# Patient Record
Sex: FEMALE | Race: WHITE | URBAN - METROPOLITAN AREA
[De-identification: names, ages, dates, MRNs, and addresses within clinical notes are randomized per-mention and may not be internally consistent; named-entity substitution may affect disease eponyms.]

---

## 2022-03-19 PROBLEM — E66.01 MORBID OBESITY (HCC): Status: ACTIVE | Noted: 2021-12-13

## 2022-06-24 ENCOUNTER — OFFICE VISIT (OUTPATIENT)
Dept: ENDOCRINOLOGY | Age: 22
End: 2022-06-24
Payer: COMMERCIAL

## 2022-06-24 VITALS
OXYGEN SATURATION: 98 % | SYSTOLIC BLOOD PRESSURE: 124 MMHG | WEIGHT: 285 LBS | DIASTOLIC BLOOD PRESSURE: 98 MMHG | HEART RATE: 81 BPM

## 2022-06-24 DIAGNOSIS — E88.81 INSULIN RESISTANCE: ICD-10-CM

## 2022-06-24 DIAGNOSIS — E66.01 MORBID OBESITY (HCC): ICD-10-CM

## 2022-06-24 DIAGNOSIS — L68.0 HIRSUTISM: Primary | ICD-10-CM

## 2022-06-24 PROCEDURE — 99213 OFFICE O/P EST LOW 20 MIN: CPT | Performed by: INTERNAL MEDICINE

## 2022-06-24 RX ORDER — SPIRONOLACTONE 50 MG/1
50 TABLET, FILM COATED ORAL DAILY
Qty: 30 TABLET | Refills: 11 | Status: SHIPPED | OUTPATIENT
Start: 2022-06-24

## 2022-06-24 RX ORDER — FLUOXETINE HYDROCHLORIDE 20 MG/1
CAPSULE ORAL DAILY
COMMUNITY
Start: 2022-02-17

## 2022-06-24 RX ORDER — METFORMIN HYDROCHLORIDE 500 MG/1
1000 TABLET, EXTENDED RELEASE ORAL DAILY
Qty: 60 TABLET | Refills: 11 | Status: SHIPPED | OUTPATIENT
Start: 2022-06-24

## 2022-06-24 RX ORDER — DEXAMETHASONE 1 MG
TABLET ORAL
Qty: 1 TABLET | Refills: 1 | Status: SHIPPED | OUTPATIENT
Start: 2022-06-24

## 2022-06-24 ASSESSMENT — ENCOUNTER SYMPTOMS
DIARRHEA: 0
NAUSEA: 0
CONSTIPATION: 0

## 2022-06-24 NOTE — PROGRESS NOTES
Cassy Salinas MD, [address]            Reason for visit: Follow-up of hirsutism      ASSESSMENT AND PLAN:    1. Hirsutism  She has mild hirsutism but not PCOS (menses have always been regular). She feels that she has responded well to spironolactone. I will continue it as prescribed. I will also arrange a low-dose overnight dexamethasone suppression test to exclude Cushing syndrome. Follow up in 1 year. - spironolactone (ALDACTONE) 50 MG tablet; Take 1 tablet by mouth daily  Dispense: 30 tablet; Refill: 11  - dexamethasone (DECADRON) 1 MG tablet; Take tablet at 11:00 PM the evening before 8:00 AM labwork  Dispense: 1 tablet; Refill: 1  - Cortisol, Baseline; Future    2. Insulin resistance  - metFORMIN (GLUCOPHAGE-XR) 500 MG extended release tablet; Take 2 tablets by mouth daily  Dispense: 60 tablet; Refill: 11    3. Morbid obesity ([address]      Follow-up and Dispositions    · Return in about 1 year (around 6/24/2023). History of Present Illness:    Emperatriz Grewal  returns for follow-up of hirsutism. She was diagnosed with PCOS (by Lynn Bradley NP) in 2020.     Disease history: Menarche age 6-9. Menses were always regular (even before OCPs). Abdominal and facial hirsutism started in teens and has been nonprogressive (improved with medical therapy); both are treated with shaving. She has always been overweight.     Current symptoms: See review of systems below     Pregnancy: Nulligravid, no know infertility, no plans for children in the near future     Treatment: Metformin XR 1000 mg at bedtime (started 2020), spironolactone 25 mg daily BID (started 2020, changed to 50 mg daily 12/2021), oral contraceptive therapy (started 2019), Adipex (started 1/2021, stopped early 2022)     Pertinent labs:  10/28/2020: TSH 1.527, glucose 103.  1/21/2021 at 3:26 PM: Testosterone 58, insulin 126 (concomitant glucose not available).   12/13/2021 at 10:35 AM: Testosterone 24, free testosterone 3.6, DHEA-sulfate 321, androstenedione 74, 17 hydroxyprogesterone 15, anti-müllerian hormone 4.30.     Pertinent imaging: none    Review of Systems   Constitutional: Positive for fatigue and unexpected weight change (gained 12 pounds in 6 months). Gastrointestinal: Negative for constipation, diarrhea and nausea. Endocrine:        Facial hirsutism is improved. Psychiatric/Behavioral: Positive for sleep disturbance (frequent awakening). BP (!) 124/98 (Site: Left Upper Arm, Position: Sitting)   Pulse 81   Wt 285 lb (129.3 kg)   SpO2 98%   Wt Readings from Last 3 Encounters:   06/24/22 285 lb (129.3 kg)   12/13/21 273 lb (123.8 kg)       Physical Exam  HENT:      Head: Normocephalic. Neck:      Thyroid: No thyroid mass or thyromegaly. Cardiovascular:      Rate and Rhythm: Normal rate. Pulmonary:      Effort: No respiratory distress. Breath sounds: Normal breath sounds. Neurological:      Mental Status: She is alert. Psychiatric:         Mood and Affect: Mood normal.         Behavior: Behavior normal.         Orders Placed This Encounter   Procedures    Cortisol, Baseline     Standing Status:   Future     Standing Expiration Date:   6/24/2023       Current Outpatient Medications   Medication Sig Dispense Refill    FLUoxetine (PROZAC) 20 MG capsule daily      spironolactone (ALDACTONE) 50 MG tablet Take 1 tablet by mouth daily 30 tablet 11    metFORMIN (GLUCOPHAGE-XR) 500 MG extended release tablet Take 2 tablets by mouth daily 60 tablet 11    dexamethasone (DECADRON) 1 MG tablet Take tablet at 11:00 PM the evening before 8:00 AM labwork 1 tablet 1    levonorgestrel-ethinyl estradiol (AVIANE) 0.1-20 MG-MCG per tablet Take 1 tablet by mouth daily       No current facility-administered medications for this visit. Cornelious Gowers, MD, FACE      Portions of this note were generated with the assistance of voice recognition software.   As such, some errors in transcription may be present.

## 2023-06-08 ENCOUNTER — OFFICE VISIT (OUTPATIENT)
Dept: ENDOCRINOLOGY | Age: 23
End: 2023-06-08
Payer: COMMERCIAL

## 2023-06-08 VITALS
DIASTOLIC BLOOD PRESSURE: 84 MMHG | HEART RATE: 90 BPM | SYSTOLIC BLOOD PRESSURE: 128 MMHG | WEIGHT: 293 LBS | OXYGEN SATURATION: 98 %

## 2023-06-08 DIAGNOSIS — L68.0 HIRSUTISM: Primary | ICD-10-CM

## 2023-06-08 DIAGNOSIS — E88.81 INSULIN RESISTANCE: ICD-10-CM

## 2023-06-08 DIAGNOSIS — E66.01 MORBID OBESITY (HCC): ICD-10-CM

## 2023-06-08 DIAGNOSIS — L68.0 HIRSUTISM: ICD-10-CM

## 2023-06-08 LAB — TSH W FREE THYROID IF ABNORMAL: 0.9 UIU/ML (ref 0.36–3.74)

## 2023-06-08 PROCEDURE — 99214 OFFICE O/P EST MOD 30 MIN: CPT | Performed by: INTERNAL MEDICINE

## 2023-06-08 RX ORDER — SPIRONOLACTONE 50 MG/1
50 TABLET, FILM COATED ORAL DAILY
Qty: 30 TABLET | Refills: 11 | Status: SHIPPED | OUTPATIENT
Start: 2023-06-08

## 2023-06-08 RX ORDER — METFORMIN HYDROCHLORIDE 500 MG/1
1000 TABLET, EXTENDED RELEASE ORAL DAILY
Qty: 60 TABLET | Refills: 11 | Status: SHIPPED | OUTPATIENT
Start: 2023-06-08

## 2023-06-08 ASSESSMENT — ENCOUNTER SYMPTOMS
CONSTIPATION: 0
DIARRHEA: 1
NAUSEA: 1

## 2023-06-08 NOTE — PROGRESS NOTES
Carlita Sanchez MD, [address] [facility]            Reason for visit: Follow-up of hirsutism      ASSESSMENT AND PLAN:    1. Hirsutism  She has mild hirsutism but not PCOS (menses have always been regular). She feels that she has responded well to spironolactone. I will continue it as prescribed. Syndrome was previously excluded with a normal low-dose overnight dexamethasone suppression test.  I will check labs today. Follow up in 1 year. - spironolactone (ALDACTONE) 50 MG tablet; Take 1 tablet by mouth daily  Dispense: 30 tablet; Refill: 11    2. Insulin resistance  - metFORMIN (GLUCOPHAGE-XR) 500 MG extended release tablet; Take 2 tablets by mouth daily  Dispense: 60 tablet; Refill: 11    3. Morbid obesity ([address]      Follow-up and Dispositions    Return in about 1 year (around 6/8/2024). History of Present Illness:    Noah Patricio  returns for follow-up of hirsutism. She was diagnosed with PCOS (by Vimal Mccoy NP) in 2020. Disease history: Menarche age 6-9. Menses were always regular (even before OCPs). Abdominal and facial hirsutism started in teens and has been nonprogressive (improved with medical therapy); both are treated with shaving. She has always been overweight. Current symptoms: See review of systems below     Pregnancy: Nulligravid, no know infertility, no plans for children in the near future     Treatment: Metformin XR 1000 mg at bedtime (started 2020), spironolactone 25 mg daily BID (started 2020, changed to 50 mg daily 12/2021), oral contraceptive therapy (started 2019), Adipex (started 1/2021, stopped early 2022)     Pertinent labs:  10/28/2020: TSH 1.527, glucose 103.  1/21/2021 at 3:26 PM: Testosterone 58, insulin 126 (concomitant glucose not available). 12/13/2021 at 10:35 AM: Testosterone 24, free testosterone 3.6, DHEA-sulfate 321, androstenedione 74, 17 hydroxyprogesterone 15, anti-müllerian hormone 4.30.

## 2023-06-09 LAB
ALBUMIN SERPL-MCNC: 3.9 G/DL (ref 3.5–5)
ALBUMIN/GLOB SERPL: 1.1 (ref 0.4–1.6)
ALP SERPL-CCNC: 65 U/L (ref 50–136)
ALT SERPL-CCNC: 40 U/L (ref 12–65)
ANION GAP SERPL CALC-SCNC: 7 MMOL/L (ref 2–11)
AST SERPL-CCNC: 21 U/L (ref 15–37)
BILIRUB SERPL-MCNC: 0.4 MG/DL (ref 0.2–1.1)
BUN SERPL-MCNC: 10 MG/DL (ref 6–23)
CALCIUM SERPL-MCNC: 9.3 MG/DL (ref 8.3–10.4)
CHLORIDE SERPL-SCNC: 105 MMOL/L (ref 101–110)
CO2 SERPL-SCNC: 26 MMOL/L (ref 21–32)
CREAT SERPL-MCNC: 0.9 MG/DL (ref 0.6–1)
EST. AVERAGE GLUCOSE BLD GHB EST-MCNC: 91 MG/DL
GLOBULIN SER CALC-MCNC: 3.6 G/DL (ref 2.8–4.5)
GLUCOSE SERPL-MCNC: 75 MG/DL (ref 65–100)
HBA1C MFR BLD: 4.8 % (ref 4.8–5.6)
POTASSIUM SERPL-SCNC: 4.4 MMOL/L (ref 3.5–5.1)
PROT SERPL-MCNC: 7.5 G/DL (ref 6.3–8.2)
SODIUM SERPL-SCNC: 138 MMOL/L (ref 133–143)

## 2023-07-16 DIAGNOSIS — L68.0 HIRSUTISM: ICD-10-CM

## 2023-07-16 DIAGNOSIS — E88.81 INSULIN RESISTANCE: ICD-10-CM

## 2023-07-17 RX ORDER — SPIRONOLACTONE 50 MG/1
50 TABLET, FILM COATED ORAL DAILY
Qty: 30 TABLET | Refills: 11 | OUTPATIENT
Start: 2023-07-17

## 2023-07-17 RX ORDER — METFORMIN HYDROCHLORIDE 500 MG/1
1000 TABLET, EXTENDED RELEASE ORAL DAILY
Qty: 60 TABLET | Refills: 11 | OUTPATIENT
Start: 2023-07-17

## 2024-06-10 ENCOUNTER — OFFICE VISIT (OUTPATIENT)
Dept: ENDOCRINOLOGY | Age: 24
End: 2024-06-10
Payer: COMMERCIAL

## 2024-06-10 VITALS — DIASTOLIC BLOOD PRESSURE: 70 MMHG | WEIGHT: 293 LBS | SYSTOLIC BLOOD PRESSURE: 118 MMHG

## 2024-06-10 DIAGNOSIS — E88.819 INSULIN RESISTANCE: ICD-10-CM

## 2024-06-10 DIAGNOSIS — L68.0 HIRSUTISM: Primary | ICD-10-CM

## 2024-06-10 DIAGNOSIS — E66.01 MORBID OBESITY (HCC): ICD-10-CM

## 2024-06-10 DIAGNOSIS — L68.0 HIRSUTISM: ICD-10-CM

## 2024-06-10 LAB
ALBUMIN SERPL-MCNC: 3.8 G/DL (ref 3.5–5)
ALBUMIN/GLOB SERPL: 1.1 (ref 1–1.9)
ALP SERPL-CCNC: 70 U/L (ref 35–104)
ALT SERPL-CCNC: 31 U/L (ref 12–65)
ANION GAP SERPL CALC-SCNC: 13 MMOL/L (ref 9–18)
AST SERPL-CCNC: 25 U/L (ref 15–37)
BILIRUB SERPL-MCNC: 0.3 MG/DL (ref 0–1.2)
BUN SERPL-MCNC: 10 MG/DL (ref 6–23)
CALCIUM SERPL-MCNC: 9.3 MG/DL (ref 8.8–10.2)
CHLORIDE SERPL-SCNC: 104 MMOL/L (ref 98–107)
CO2 SERPL-SCNC: 21 MMOL/L (ref 20–28)
CREAT SERPL-MCNC: 0.82 MG/DL (ref 0.6–1.1)
EST. AVERAGE GLUCOSE BLD GHB EST-MCNC: 95 MG/DL
GLOBULIN SER CALC-MCNC: 3.5 G/DL (ref 2.3–3.5)
GLUCOSE SERPL-MCNC: 86 MG/DL (ref 70–99)
HBA1C MFR BLD: 4.9 % (ref 0–5.6)
POTASSIUM SERPL-SCNC: 4.1 MMOL/L (ref 3.5–5.1)
PROT SERPL-MCNC: 7.3 G/DL (ref 6.3–8.2)
SODIUM SERPL-SCNC: 138 MMOL/L (ref 136–145)
TSH W FREE THYROID IF ABNORMAL: 1.47 UIU/ML (ref 0.27–4.2)

## 2024-06-10 PROCEDURE — 99214 OFFICE O/P EST MOD 30 MIN: CPT | Performed by: INTERNAL MEDICINE

## 2024-06-10 RX ORDER — METFORMIN HYDROCHLORIDE 500 MG/1
1000 TABLET, EXTENDED RELEASE ORAL DAILY
Qty: 60 TABLET | Refills: 11 | Status: SHIPPED | OUTPATIENT
Start: 2024-06-10

## 2024-06-10 RX ORDER — SPIRONOLACTONE 50 MG/1
50 TABLET, FILM COATED ORAL DAILY
Qty: 30 TABLET | Refills: 11 | Status: SHIPPED | OUTPATIENT
Start: 2024-06-10

## 2024-06-10 ASSESSMENT — ENCOUNTER SYMPTOMS
DIARRHEA: 1
NAUSEA: 1
CONSTIPATION: 0

## 2024-06-10 NOTE — PROGRESS NOTES
MALACHI Calix MD, [facility]   [facility]            Reason for visit: Follow-up of hirsutism      ASSESSMENT AND PLAN:    1. Hirsutism  She has mild hirsutism but not PCOS (menses have always been regular).  She feels that she has responded well to spironolactone.  I will continue it as prescribed.  Cushing syndrome was previously excluded with a normal low-dose overnight dexamethasone suppression test.  I will check labs today.  Follow up in 1 year.  She is not planning pregnancy (currently on oral contraceptive therapy) and is aware that she will need to discontinue spironolactone before trying.  - spironolactone (ALDACTONE) 50 MG tablet; Take 1 tablet by mouth daily  Dispense: 30 tablet; Refill: 11    2. Insulin resistance  - metFORMIN (GLUCOPHAGE-XR) 500 MG extended release tablet; Take 2 tablets by mouth daily  Dispense: 60 tablet; Refill: 11    3. Morbid obesity ([facility])      Follow-up and Dispositions    Return in about 1 year (around 6/10/2025).             History of Present Illness:    Beata Ro  returns for follow-up of hirsutism. She was diagnosed with PCOS (by Frida Davison NP) in 2020.     Disease history: Menarche age 10-11.  Menses were always regular (even before OCPs).  Abdominal and facial hirsutism started in teens and has been nonprogressive (improved with medical therapy); both are treated with shaving.  She has always been overweight.     Current symptoms: See review of systems below     Pregnancy: Nulligravid, no know infertility, no plans for children in the near future, menses are regular on OCPs     Treatment: Metformin XR 1000 mg at bedtime (started 2020), spironolactone 25 mg daily BID (started 2020, changed to 50 mg daily 12/2021), oral contraceptive therapy (started 2019), Adipex (started 1/2021, stopped early 2022)     Pertinent labs:  10/28/2020: TSH 1.527, glucose 103.  1/21/2021 at 3:26 PM: Testosterone 58, insulin 126

## 2025-03-15 DIAGNOSIS — L68.0 HIRSUTISM: ICD-10-CM

## 2025-03-19 RX ORDER — SPIRONOLACTONE 50 MG/1
50 TABLET, FILM COATED ORAL DAILY
Qty: 90 TABLET | Refills: 4 | OUTPATIENT
Start: 2025-03-19

## 2025-06-10 ENCOUNTER — OFFICE VISIT (OUTPATIENT)
Dept: ENDOCRINOLOGY | Age: 25
End: 2025-06-10
Payer: COMMERCIAL

## 2025-06-10 VITALS
DIASTOLIC BLOOD PRESSURE: 84 MMHG | WEIGHT: 292.4 LBS | SYSTOLIC BLOOD PRESSURE: 134 MMHG | HEART RATE: 90 BPM | BODY MASS INDEX: 49.92 KG/M2 | HEIGHT: 64 IN | OXYGEN SATURATION: 97 %

## 2025-06-10 DIAGNOSIS — L68.0 HIRSUTISM: ICD-10-CM

## 2025-06-10 DIAGNOSIS — E88.819 INSULIN RESISTANCE: Primary | ICD-10-CM

## 2025-06-10 DIAGNOSIS — O99.283 ENDOCRINE, NUTRITIONAL AND METABOLIC DISEASES COMPLICATING PREGNANCY, THIRD TRIMESTER: ICD-10-CM

## 2025-06-10 DIAGNOSIS — E66.01 MORBID OBESITY (HCC): ICD-10-CM

## 2025-06-10 LAB — HBA1C MFR BLD: 4.6 %

## 2025-06-10 PROCEDURE — 83036 HEMOGLOBIN GLYCOSYLATED A1C: CPT | Performed by: INTERNAL MEDICINE

## 2025-06-10 PROCEDURE — 99213 OFFICE O/P EST LOW 20 MIN: CPT | Performed by: INTERNAL MEDICINE

## 2025-06-10 RX ORDER — M-VIT,TX,IRON,MINS/CALC/FOLIC 27MG-0.4MG
1 TABLET ORAL DAILY
COMMUNITY

## 2025-06-10 ASSESSMENT — ENCOUNTER SYMPTOMS
NAUSEA: 0
DIARRHEA: 0
CONSTIPATION: 0

## 2025-06-10 NOTE — PROGRESS NOTES
facility-administered medications for this visit.       MALACHI Calix MD, FACE      Portions of this note were generated with the assistance of voice recognition software.  As such, some errors in transcription may be present.

## 2025-06-22 DIAGNOSIS — E88.819 INSULIN RESISTANCE: ICD-10-CM

## 2025-06-23 RX ORDER — METFORMIN HYDROCHLORIDE 500 MG/1
1000 TABLET, EXTENDED RELEASE ORAL DAILY
Qty: 180 TABLET | Refills: 3 | Status: SHIPPED | OUTPATIENT
Start: 2025-06-23